# Patient Record
Sex: FEMALE | URBAN - METROPOLITAN AREA
[De-identification: names, ages, dates, MRNs, and addresses within clinical notes are randomized per-mention and may not be internally consistent; named-entity substitution may affect disease eponyms.]

---

## 2018-08-23 ENCOUNTER — NEW PATIENT (OUTPATIENT)
Dept: URBAN - METROPOLITAN AREA CLINIC 60 | Facility: CLINIC | Age: 68
End: 2018-08-23
Payer: MEDICARE

## 2018-08-23 DIAGNOSIS — H43.391 OTHER VITREOUS OPACITIES, RIGHT EYE: ICD-10-CM

## 2018-08-23 DIAGNOSIS — H25.13 AGE-RELATED NUCLEAR CATARACT, BILATERAL: Primary | ICD-10-CM

## 2018-08-23 DIAGNOSIS — H52.13 MYOPIA, BILATERAL: ICD-10-CM

## 2018-08-23 PROCEDURE — 76514 ECHO EXAM OF EYE THICKNESS: CPT | Performed by: OPTOMETRIST

## 2018-08-23 PROCEDURE — 92082 INTERMEDIATE VISUAL FIELD XM: CPT | Performed by: OPTOMETRIST

## 2018-08-23 PROCEDURE — 92133 CPTRZD OPH DX IMG PST SGM ON: CPT | Performed by: OPTOMETRIST

## 2018-08-23 PROCEDURE — 92004 COMPRE OPH EXAM NEW PT 1/>: CPT | Performed by: OPTOMETRIST

## 2018-08-23 ASSESSMENT — INTRAOCULAR PRESSURE
OS: 17
OD: 16

## 2018-08-23 ASSESSMENT — VISUAL ACUITY
OS: 20/40
OD: 20/30

## 2018-08-27 ENCOUNTER — NEW PATIENT (OUTPATIENT)
Dept: URBAN - METROPOLITAN AREA CLINIC 60 | Facility: CLINIC | Age: 68
End: 2018-08-27
Payer: COMMERCIAL

## 2018-08-27 DIAGNOSIS — H16.223 KERATOCONJUNCTIVITIS SICCA, BILATERAL: ICD-10-CM

## 2018-08-27 PROCEDURE — 92004 COMPRE OPH EXAM NEW PT 1/>: CPT | Performed by: OPHTHALMOLOGY

## 2018-08-27 PROCEDURE — 92025 CPTRIZED CORNEAL TOPOGRAPHY: CPT | Performed by: OPHTHALMOLOGY

## 2018-08-27 RX ORDER — DIPHENHYDRAMINE HCL/CALAMINE
LOTION (ML) TOPICAL
Qty: 1 | Refills: 0 | Status: INACTIVE
Start: 2018-08-27 | End: 2019-06-10

## 2018-08-27 RX ORDER — PROPYLENE GLYCOL 0.06 MG/ML
0.6 % EMULSION OPHTHALMIC
Qty: 1 | Refills: 0 | Status: INACTIVE
Start: 2018-08-27 | End: 2019-06-10

## 2018-08-27 RX ORDER — POLYETHYLENE GLYCOL 400 AND PROPYLENE GLYCOL 4; 3 MG/ML; MG/ML
GEL OPHTHALMIC
Qty: 1 | Refills: 0 | Status: INACTIVE
Start: 2018-08-27 | End: 2019-06-10

## 2018-08-27 ASSESSMENT — VISUAL ACUITY
OD: 20/30
OS: 20/40

## 2018-08-27 ASSESSMENT — INTRAOCULAR PRESSURE
OD: 10
OS: 10

## 2019-06-10 ENCOUNTER — FOLLOW UP ESTABLISHED (OUTPATIENT)
Dept: URBAN - METROPOLITAN AREA CLINIC 60 | Facility: CLINIC | Age: 69
End: 2019-06-10
Payer: COMMERCIAL

## 2019-06-10 DIAGNOSIS — H52.4 PRESBYOPIA: ICD-10-CM

## 2019-06-10 PROCEDURE — 92014 COMPRE OPH EXAM EST PT 1/>: CPT | Performed by: OPHTHALMOLOGY

## 2019-06-10 ASSESSMENT — VISUAL ACUITY
OD: 20/30
OS: 20/30

## 2019-06-10 ASSESSMENT — INTRAOCULAR PRESSURE
OS: 10
OD: 10

## 2019-12-10 ENCOUNTER — FOLLOW UP ESTABLISHED (OUTPATIENT)
Dept: URBAN - METROPOLITAN AREA CLINIC 60 | Facility: CLINIC | Age: 69
End: 2019-12-10
Payer: COMMERCIAL

## 2019-12-10 PROCEDURE — 92012 INTRM OPH EXAM EST PATIENT: CPT | Performed by: OPTOMETRIST

## 2019-12-10 RX ORDER — CALCIUM CARBONATE 500(1250)
TABLET ORAL
Qty: 0 | Refills: 0 | Status: ACTIVE
Start: 2019-12-10

## 2019-12-10 ASSESSMENT — VISUAL ACUITY
OS: 20/30
OD: 20/30

## 2019-12-10 ASSESSMENT — INTRAOCULAR PRESSURE
OS: 13
OD: 12

## 2020-06-19 ENCOUNTER — FOLLOW UP ESTABLISHED (OUTPATIENT)
Dept: URBAN - METROPOLITAN AREA CLINIC 60 | Facility: CLINIC | Age: 70
End: 2020-06-19
Payer: MEDICARE

## 2020-06-19 PROCEDURE — 92014 COMPRE OPH EXAM EST PT 1/>: CPT | Performed by: OPHTHALMOLOGY

## 2020-06-19 ASSESSMENT — INTRAOCULAR PRESSURE
OD: 14
OS: 14

## 2020-06-19 ASSESSMENT — VISUAL ACUITY
OD: 20/40
OS: 20/30

## 2021-07-30 ENCOUNTER — OFFICE VISIT (OUTPATIENT)
Dept: URBAN - METROPOLITAN AREA CLINIC 60 | Facility: CLINIC | Age: 71
End: 2021-07-30
Payer: COMMERCIAL

## 2021-07-30 DIAGNOSIS — H25.813 COMBINED FORMS OF AGE-RELATED CATARACT, BILATERAL: Primary | ICD-10-CM

## 2021-07-30 DIAGNOSIS — H40.013 OPEN ANGLE WITH BORDERLINE FINDINGS, LOW RISK, BILATERAL: ICD-10-CM

## 2021-07-30 PROCEDURE — 99213 OFFICE O/P EST LOW 20 MIN: CPT | Performed by: OPHTHALMOLOGY

## 2021-07-30 PROCEDURE — 92133 CPTRZD OPH DX IMG PST SGM ON: CPT | Performed by: OPHTHALMOLOGY

## 2021-07-30 ASSESSMENT — INTRAOCULAR PRESSURE
OS: 14
OD: 14

## 2021-07-30 ASSESSMENT — VISUAL ACUITY
OS: 20/50
OD: 20/60

## 2021-07-30 ASSESSMENT — KERATOMETRY
OD: 46.16
OS: 46.65

## 2021-07-30 NOTE — IMPRESSION/PLAN
Impression: Glaucoma suspect secondary to [address]read [place] 76 appearance, low risk, bilateral Plan: IOP stable. OCT reviewed today. Cont to observe without drops at this time.

## 2021-07-30 NOTE — IMPRESSION/PLAN
Impression: Combined forms of age-related cataract, bilateral: H25.813. Plan: Discussed diagnosis in detail with patient. Discussed treatment options with patient. Patient prefers no treatment at this time. Will continue to observe condition and or symptoms. Call if [address] worsens.

## 2022-08-01 ENCOUNTER — OFFICE VISIT (OUTPATIENT)
Dept: URBAN - METROPOLITAN AREA CLINIC 60 | Facility: CLINIC | Age: 72
End: 2022-08-01
Payer: COMMERCIAL

## 2022-08-01 DIAGNOSIS — H25.813 COMBINED FORMS OF AGE-RELATED CATARACT, BILATERAL: Primary | ICD-10-CM

## 2022-08-01 DIAGNOSIS — H40.013 OPEN ANGLE WITH BORDERLINE FINDINGS, LOW RISK, BILATERAL: ICD-10-CM

## 2022-08-01 PROCEDURE — 99214 OFFICE O/P EST MOD 30 MIN: CPT | Performed by: OPHTHALMOLOGY

## 2022-08-01 ASSESSMENT — KERATOMETRY
OD: 46.19
OS: 46.85

## 2022-08-01 ASSESSMENT — VISUAL ACUITY
OS: 20/40
OD: 20/60

## 2022-08-01 ASSESSMENT — INTRAOCULAR PRESSURE
OS: 12
OD: 14

## 2022-08-01 NOTE — IMPRESSION/PLAN
Impression: Glaucoma suspect secondary to Dread [address] 76 appearance, low risk, bilateral Plan: IOP stable. Cont to observe without drops at this time.

## 2022-08-01 NOTE — IMPRESSION/PLAN
Impression: Combined forms of age-related cataract, bilateral: H25.813. Plan: Cataracts account for the patient's complaints. No treatment currently recommended. Discussed surgery vs observation, patient elects to wait until symptoms worsen. The patient will monitor vision changes and contact us with any decrease in vision.

## 2023-08-11 ENCOUNTER — OFFICE VISIT (OUTPATIENT)
Dept: URBAN - METROPOLITAN AREA CLINIC 60 | Facility: CLINIC | Age: 73
End: 2023-08-11
Payer: COMMERCIAL

## 2023-08-11 DIAGNOSIS — H40.013 OPEN ANGLE WITH BORDERLINE FINDINGS, LOW RISK, BILATERAL: ICD-10-CM

## 2023-08-11 DIAGNOSIS — H25.813 COMBINED FORMS OF AGE-RELATED CATARACT, BILATERAL: Primary | ICD-10-CM

## 2023-08-11 PROCEDURE — 92014 COMPRE OPH EXAM EST PT 1/>: CPT | Performed by: OPHTHALMOLOGY

## 2023-08-11 PROCEDURE — 92133 CPTRZD OPH DX IMG PST SGM ON: CPT | Performed by: OPHTHALMOLOGY

## 2023-08-11 ASSESSMENT — KERATOMETRY
OD: 46.55
OS: 47.48

## 2023-08-11 ASSESSMENT — INTRAOCULAR PRESSURE
OS: 15
OD: 13

## 2023-08-11 ASSESSMENT — VISUAL ACUITY
OS: 20/50
OD: 20/60